# Patient Record
Sex: FEMALE | Race: WHITE | NOT HISPANIC OR LATINO
[De-identification: names, ages, dates, MRNs, and addresses within clinical notes are randomized per-mention and may not be internally consistent; named-entity substitution may affect disease eponyms.]

---

## 2017-01-04 PROBLEM — Z00.00 ENCOUNTER FOR PREVENTIVE HEALTH EXAMINATION: Status: ACTIVE | Noted: 2017-01-04

## 2017-02-08 ENCOUNTER — APPOINTMENT (OUTPATIENT)
Dept: SPINE | Facility: CLINIC | Age: 47
End: 2017-02-08

## 2017-02-08 VITALS
SYSTOLIC BLOOD PRESSURE: 113 MMHG | BODY MASS INDEX: 23.3 KG/M2 | DIASTOLIC BLOOD PRESSURE: 74 MMHG | WEIGHT: 145 LBS | HEIGHT: 66 IN | HEART RATE: 92 BPM

## 2017-02-08 DIAGNOSIS — Z83.49 FAMILY HISTORY OF OTHER ENDOCRINE, NUTRITIONAL AND METABOLIC DISEASES: ICD-10-CM

## 2017-02-08 DIAGNOSIS — Z78.9 OTHER SPECIFIED HEALTH STATUS: ICD-10-CM

## 2017-02-08 DIAGNOSIS — Z87.42 PERSONAL HISTORY OF OTHER DISEASES OF THE FEMALE GENITAL TRACT: ICD-10-CM

## 2017-02-08 DIAGNOSIS — Z82.0 FAMILY HISTORY OF EPILEPSY AND OTHER DISEASES OF THE NERVOUS SYSTEM: ICD-10-CM

## 2017-02-08 DIAGNOSIS — Z86.69 PERSONAL HISTORY OF OTHER DISEASES OF THE NERVOUS SYSTEM AND SENSE ORGANS: ICD-10-CM

## 2017-02-08 DIAGNOSIS — M51.26 OTHER INTERVERTEBRAL DISC DISPLACEMENT, LUMBAR REGION: ICD-10-CM

## 2017-02-08 DIAGNOSIS — Z82.3 FAMILY HISTORY OF STROKE: ICD-10-CM

## 2017-02-08 DIAGNOSIS — Z82.49 FAMILY HISTORY OF ISCHEMIC HEART DISEASE AND OTHER DISEASES OF THE CIRCULATORY SYSTEM: ICD-10-CM

## 2017-02-08 DIAGNOSIS — Z83.3 FAMILY HISTORY OF DIABETES MELLITUS: ICD-10-CM

## 2017-02-08 DIAGNOSIS — Z87.39 PERSONAL HISTORY OF OTHER DISEASES OF THE MUSCULOSKELETAL SYSTEM AND CONNECTIVE TISSUE: ICD-10-CM

## 2017-02-08 DIAGNOSIS — Z87.891 PERSONAL HISTORY OF NICOTINE DEPENDENCE: ICD-10-CM

## 2017-02-08 DIAGNOSIS — M70.71 OTHER BURSITIS OF HIP, RIGHT HIP: ICD-10-CM

## 2017-02-08 DIAGNOSIS — Z86.39 PERSONAL HISTORY OF OTHER ENDOCRINE, NUTRITIONAL AND METABOLIC DISEASE: ICD-10-CM

## 2017-02-08 DIAGNOSIS — Z80.9 FAMILY HISTORY OF MALIGNANT NEOPLASM, UNSPECIFIED: ICD-10-CM

## 2017-02-08 DIAGNOSIS — R00.0 TACHYCARDIA, UNSPECIFIED: ICD-10-CM

## 2017-02-08 RX ORDER — LACTOBACILLUS ACIDOPHILUS/PECT 30 MG-20MG
TABLET ORAL
Refills: 0 | Status: ACTIVE | COMMUNITY

## 2017-02-08 RX ORDER — HYDROXYCHLOROQUINE SULFATE 200 MG/1
200 TABLET, FILM COATED ORAL
Qty: 60 | Refills: 0 | Status: ACTIVE | COMMUNITY
Start: 2016-09-03

## 2017-02-08 RX ORDER — ASPIRIN 81 MG
81 TABLET, DELAYED RELEASE (ENTERIC COATED) ORAL
Refills: 0 | Status: ACTIVE | COMMUNITY

## 2017-02-08 RX ORDER — LEVOTHYROXINE SODIUM 0.1 MG/1
100 TABLET ORAL
Qty: 30 | Refills: 0 | Status: ACTIVE | COMMUNITY
Start: 2016-08-11

## 2017-02-08 RX ORDER — DILTIAZEM HYDROCHLORIDE 180 MG/1
180 CAPSULE, EXTENDED RELEASE ORAL
Qty: 30 | Refills: 0 | Status: ACTIVE | COMMUNITY
Start: 2016-08-11

## 2018-05-17 ENCOUNTER — OUTPATIENT (OUTPATIENT)
Dept: OUTPATIENT SERVICES | Facility: HOSPITAL | Age: 48
LOS: 1 days | Discharge: HOME | End: 2018-05-17

## 2018-05-21 ENCOUNTER — APPOINTMENT (OUTPATIENT)
Dept: HEMATOLOGY ONCOLOGY | Facility: CLINIC | Age: 48
End: 2018-05-21
Payer: COMMERCIAL

## 2018-05-21 VITALS
TEMPERATURE: 98.3 F | WEIGHT: 163.47 LBS | SYSTOLIC BLOOD PRESSURE: 124 MMHG | BODY MASS INDEX: 26.27 KG/M2 | OXYGEN SATURATION: 94 % | HEIGHT: 66 IN | DIASTOLIC BLOOD PRESSURE: 76 MMHG | HEART RATE: 80 BPM

## 2018-05-21 PROCEDURE — 99243 OFF/OP CNSLTJ NEW/EST LOW 30: CPT

## 2019-04-08 ENCOUNTER — INPATIENT (INPATIENT)
Facility: HOSPITAL | Age: 49
LOS: 0 days | Discharge: HOME | DRG: 103 | End: 2019-04-09
Attending: INTERNAL MEDICINE | Admitting: INTERNAL MEDICINE
Payer: COMMERCIAL

## 2019-04-08 VITALS
HEART RATE: 84 BPM | OXYGEN SATURATION: 97 % | HEIGHT: 65 IN | TEMPERATURE: 98 F | WEIGHT: 164.91 LBS | SYSTOLIC BLOOD PRESSURE: 121 MMHG | DIASTOLIC BLOOD PRESSURE: 83 MMHG | RESPIRATION RATE: 16 BRPM

## 2019-04-08 DIAGNOSIS — E03.9 HYPOTHYROIDISM, UNSPECIFIED: ICD-10-CM

## 2019-04-08 DIAGNOSIS — R51 HEADACHE: ICD-10-CM

## 2019-04-08 DIAGNOSIS — R00.0 TACHYCARDIA, UNSPECIFIED: ICD-10-CM

## 2019-04-08 DIAGNOSIS — M06.9 RHEUMATOID ARTHRITIS, UNSPECIFIED: ICD-10-CM

## 2019-04-08 DIAGNOSIS — Z29.9 ENCOUNTER FOR PROPHYLACTIC MEASURES, UNSPECIFIED: ICD-10-CM

## 2019-04-08 DIAGNOSIS — G62.9 POLYNEUROPATHY, UNSPECIFIED: ICD-10-CM

## 2019-04-08 LAB
ALBUMIN SERPL ELPH-MCNC: 4.4 G/DL — SIGNIFICANT CHANGE UP (ref 3.3–5)
ALP SERPL-CCNC: 55 U/L — SIGNIFICANT CHANGE UP (ref 30–120)
ALT FLD-CCNC: 35 U/L DA — SIGNIFICANT CHANGE UP (ref 10–60)
ANION GAP SERPL CALC-SCNC: 10 MMOL/L — SIGNIFICANT CHANGE UP (ref 5–17)
APTT BLD: 30 SEC — SIGNIFICANT CHANGE UP (ref 28.5–37)
AST SERPL-CCNC: 30 U/L — SIGNIFICANT CHANGE UP (ref 10–40)
BASOPHILS # BLD AUTO: 0.05 K/UL — SIGNIFICANT CHANGE UP (ref 0–0.2)
BASOPHILS NFR BLD AUTO: 1 % — SIGNIFICANT CHANGE UP (ref 0–2)
BILIRUB SERPL-MCNC: 0.3 MG/DL — SIGNIFICANT CHANGE UP (ref 0.2–1.2)
BUN SERPL-MCNC: 8 MG/DL — SIGNIFICANT CHANGE UP (ref 7–23)
CALCIUM SERPL-MCNC: 9.1 MG/DL — SIGNIFICANT CHANGE UP (ref 8.4–10.5)
CHLORIDE SERPL-SCNC: 106 MMOL/L — SIGNIFICANT CHANGE UP (ref 96–108)
CO2 SERPL-SCNC: 28 MMOL/L — SIGNIFICANT CHANGE UP (ref 22–31)
CREAT SERPL-MCNC: 0.74 MG/DL — SIGNIFICANT CHANGE UP (ref 0.5–1.3)
EOSINOPHIL # BLD AUTO: 0.04 K/UL — SIGNIFICANT CHANGE UP (ref 0–0.5)
EOSINOPHIL NFR BLD AUTO: 0.8 % — SIGNIFICANT CHANGE UP (ref 0–6)
GLUCOSE SERPL-MCNC: 101 MG/DL — HIGH (ref 70–99)
HCG SERPL-ACNC: 6 MIU/ML — SIGNIFICANT CHANGE UP
HCT VFR BLD CALC: 43 % — SIGNIFICANT CHANGE UP (ref 34.5–45)
HGB BLD-MCNC: 14.6 G/DL — SIGNIFICANT CHANGE UP (ref 11.5–15.5)
IMM GRANULOCYTES NFR BLD AUTO: 0.2 % — SIGNIFICANT CHANGE UP (ref 0–1.5)
INR BLD: 0.97 RATIO — SIGNIFICANT CHANGE UP (ref 0.88–1.16)
LYMPHOCYTES # BLD AUTO: 1.29 K/UL — SIGNIFICANT CHANGE UP (ref 1–3.3)
LYMPHOCYTES # BLD AUTO: 25.4 % — SIGNIFICANT CHANGE UP (ref 13–44)
MCHC RBC-ENTMCNC: 33.9 PG — SIGNIFICANT CHANGE UP (ref 27–34)
MCHC RBC-ENTMCNC: 34 GM/DL — SIGNIFICANT CHANGE UP (ref 32–36)
MCV RBC AUTO: 99.8 FL — SIGNIFICANT CHANGE UP (ref 80–100)
MONOCYTES # BLD AUTO: 0.71 K/UL — SIGNIFICANT CHANGE UP (ref 0–0.9)
MONOCYTES NFR BLD AUTO: 14 % — SIGNIFICANT CHANGE UP (ref 2–14)
NEUTROPHILS # BLD AUTO: 2.98 K/UL — SIGNIFICANT CHANGE UP (ref 1.8–7.4)
NEUTROPHILS NFR BLD AUTO: 58.6 % — SIGNIFICANT CHANGE UP (ref 43–77)
NRBC # BLD: 0 /100 WBCS — SIGNIFICANT CHANGE UP (ref 0–0)
PLATELET # BLD AUTO: 286 K/UL — SIGNIFICANT CHANGE UP (ref 150–400)
POTASSIUM SERPL-MCNC: 4 MMOL/L — SIGNIFICANT CHANGE UP (ref 3.5–5.3)
POTASSIUM SERPL-SCNC: 4 MMOL/L — SIGNIFICANT CHANGE UP (ref 3.5–5.3)
PROT SERPL-MCNC: 7.8 G/DL — SIGNIFICANT CHANGE UP (ref 6–8.3)
PROTHROM AB SERPL-ACNC: 10.6 SEC — SIGNIFICANT CHANGE UP (ref 10–12.9)
RBC # BLD: 4.31 M/UL — SIGNIFICANT CHANGE UP (ref 3.8–5.2)
RBC # FLD: 12.6 % — SIGNIFICANT CHANGE UP (ref 10.3–14.5)
SODIUM SERPL-SCNC: 144 MMOL/L — SIGNIFICANT CHANGE UP (ref 135–145)
WBC # BLD: 5.08 K/UL — SIGNIFICANT CHANGE UP (ref 3.8–10.5)
WBC # FLD AUTO: 5.08 K/UL — SIGNIFICANT CHANGE UP (ref 3.8–10.5)

## 2019-04-08 PROCEDURE — 99285 EMERGENCY DEPT VISIT HI MDM: CPT

## 2019-04-08 PROCEDURE — 93306 TTE W/DOPPLER COMPLETE: CPT | Mod: 26

## 2019-04-08 PROCEDURE — 70450 CT HEAD/BRAIN W/O DYE: CPT | Mod: 26

## 2019-04-08 PROCEDURE — 70496 CT ANGIOGRAPHY HEAD: CPT | Mod: 26

## 2019-04-08 RX ORDER — LEVOTHYROXINE SODIUM 125 MCG
1 TABLET ORAL
Qty: 0 | Refills: 0 | COMMUNITY

## 2019-04-08 RX ORDER — HYDROXYCHLOROQUINE SULFATE 200 MG
0 TABLET ORAL
Qty: 0 | Refills: 0 | COMMUNITY

## 2019-04-08 RX ORDER — ASPIRIN/CALCIUM CARB/MAGNESIUM 324 MG
81 TABLET ORAL DAILY
Qty: 0 | Refills: 0 | Status: DISCONTINUED | OUTPATIENT
Start: 2019-04-08 | End: 2019-04-09

## 2019-04-08 RX ORDER — LEVOTHYROXINE SODIUM 125 MCG
100 TABLET ORAL DAILY
Qty: 0 | Refills: 0 | Status: DISCONTINUED | OUTPATIENT
Start: 2019-04-08 | End: 2019-04-09

## 2019-04-08 RX ORDER — TOFACITINIB 11 MG/1
1 TABLET, FILM COATED, EXTENDED RELEASE ORAL
Qty: 0 | Refills: 0 | COMMUNITY

## 2019-04-08 RX ORDER — DILTIAZEM HCL 120 MG
180 CAPSULE, EXT RELEASE 24 HR ORAL DAILY
Qty: 0 | Refills: 0 | Status: DISCONTINUED | OUTPATIENT
Start: 2019-04-08 | End: 2019-04-09

## 2019-04-08 RX ORDER — ASPIRIN/CALCIUM CARB/MAGNESIUM 324 MG
1 TABLET ORAL
Qty: 0 | Refills: 0 | COMMUNITY

## 2019-04-08 RX ORDER — HYDROXYCHLOROQUINE SULFATE 200 MG
200 TABLET ORAL
Qty: 0 | Refills: 0 | Status: DISCONTINUED | OUTPATIENT
Start: 2019-04-08 | End: 2019-04-09

## 2019-04-08 RX ORDER — ACETAMINOPHEN 500 MG
650 TABLET ORAL EVERY 6 HOURS
Qty: 0 | Refills: 0 | Status: DISCONTINUED | OUTPATIENT
Start: 2019-04-08 | End: 2019-04-09

## 2019-04-08 RX ORDER — ATORVASTATIN CALCIUM 80 MG/1
20 TABLET, FILM COATED ORAL AT BEDTIME
Qty: 0 | Refills: 0 | Status: DISCONTINUED | OUTPATIENT
Start: 2019-04-08 | End: 2019-04-09

## 2019-04-08 RX ORDER — IBUPROFEN 200 MG
400 TABLET ORAL ONCE
Qty: 0 | Refills: 0 | Status: COMPLETED | OUTPATIENT
Start: 2019-04-08 | End: 2019-04-08

## 2019-04-08 RX ADMIN — Medication 81 MILLIGRAM(S): at 18:33

## 2019-04-08 RX ADMIN — Medication 200 MILLIGRAM(S): at 18:31

## 2019-04-08 RX ADMIN — ATORVASTATIN CALCIUM 20 MILLIGRAM(S): 80 TABLET, FILM COATED ORAL at 21:59

## 2019-04-08 RX ADMIN — Medication 650 MILLIGRAM(S): at 17:45

## 2019-04-08 RX ADMIN — Medication 400 MILLIGRAM(S): at 21:00

## 2019-04-08 RX ADMIN — Medication 650 MILLIGRAM(S): at 15:55

## 2019-04-08 RX ADMIN — Medication 50 MILLIGRAM(S): at 18:31

## 2019-04-08 RX ADMIN — Medication 400 MILLIGRAM(S): at 20:20

## 2019-04-08 NOTE — CONSULT NOTE ADULT - ASSESSMENT
Send by her Neurologist for Headache and feeling 3 days ago that some thing popped in her head.  Non focal exam.  CT Head/CTA head done on my recommendation.  CT Head no acute pathology.  CT Head  - Normal study.  R/o CVA, Demyelinating disease.   Admission.  Tylenol for HA.  Ecotrin 81 mg  Carotid doppler  Lipid panel/HbA1c.  MRI Brain pre/post with gado in am.  D/w pt. Questions answered.  D/w Dr De Leon/CEE Wallis  Would follow.

## 2019-04-08 NOTE — H&P ADULT - PROBLEM SELECTOR PLAN 1
Admit  Neurology Eval  Will need MRI  Further work-up/management pending clinical course. Admit  ASA 81mg qd  Start lipitor 20mg qhs  Neuro Check q4h  Neurology Eval  Will need MRI  Further work-up/management pending clinical course. Admit  ASA 81mg qd  Start lipitor 20mg qhs  Neuro Check q4h  Neurology Eval  Will need MRI  ECHO  Carotid u/s to r/o CONOR  Further work-up/management pending clinical course.

## 2019-04-08 NOTE — ED ADULT NURSE NOTE - OBJECTIVE STATEMENT
49 yr. old female c/o sharp head pain with nausea/dizziness/visual disturbances x 20 minutes on Friday.  Pt. c/o pressure on top of head yesterday.  Currently, pain 5/10.

## 2019-04-08 NOTE — ED PROVIDER NOTE - OBJECTIVE STATEMENT
48 y/o female with a PMHx of RA presents to the ED c/o headache. 4 days ago pt was at work felt a painful snap in the back her head, immediately felt nausea, blurry vision, and about to pass out. Sx lasted about 20 minutes then went away. Pt saw her PMD that day for a regular checkup and was told if it happens again to go to a neurologist. 3 days ago pt felt another snap on the side of her head with similar sx. Saw Dr. Peña and was referred to the ED for CT head to r/o possible bleed. Denies fever, chills, nausea at this time. Allergy to morphine, urticaria. Former smoker (quit 20 years ago)  Neuro: Dr. Peña. PCP: Dr. Sanz

## 2019-04-08 NOTE — CONSULT NOTE ADULT - SUBJECTIVE AND OBJECTIVE BOX
Patient is a 49y old  Female who presents with a chief complaint of Headache.    HPI: 48 y/o female with a PMHx of RA presents to the ED c/o headache. 4 days ago pt was at work felt a painful snap in the back her head, immediately felt nausea, blurry vision, and about to pass out. Sx lasted about 20 minutes then went away. Pt saw her PMD  that day for a regular checkup and was told if it happens again to go to a neurologist. 3 days ago pt felt another snap on the side of her head with similar sx. Saw Dr. Peña and was referred to the ED for CT head to r/o possible bleed.   Pt also has family history of Brain aneurysm.   Denies fever, chills, nausea at this time.   No fever. No neck pain or stiffness.  Does have h/o ocular migraine.  C/o numbness on right ulnar border for couple of moths.  Has allergy to morphine - urticaria.   Former smoker (quit 20 years ago)  Neuro: Dr. Peña. PCP: Dr. Sanz.    PAST MEDICAL & SURGICAL HISTORY:    RA (rheumatoid arthritis)  Ocular Migraine  Hypothyroidism.  Palpitations.    No significant past surgical history    Home Medications:     * Patient Currently Takes Medications as of 08-Apr-2019 11:58 documented in Structured Notes  · 	Synthroid 100 mcg (0.1 mg) oral tablet: Last Dose Taken:  , 1 tab(s) orally once a day  · 	Plaquenil 200 mg oral tablet: Last Dose Taken:  , orally 2 times a day  · 	Xeljanz XR 11 mg oral tablet, extended release: Last Dose Taken:  , 1 tab(s) orally once a day  · 	Lyrica 50 mg oral capsule: Last Dose Taken:  , 1 cap(s) orally 2 times a day  · 	Cardizem: Last Dose Taken:  , 180  orally once a day  · 	aspirin 81 mg oral tablet: Last Dose Taken:  , 1 tab(s) orally once a day    Allergies    morphine (Rash)    SOCIAL HISTORY:    No h/o alcohol use.  Ex Smoker. Quite in past 20 yrs ago.     FAMILY HISTORY: Father's brother has brain aneurysm.     REVIEW OF SYSTEMS:    CONSTITUTIONAL: No fever  EYES: No eye pain,   ENMT:  No sinus or throat pain  NECK: No pain or stiffness  RESPIRATORY: No cough, No hemoptysis; No shortness of breath  CARDIOVASCULAR: No acute chest pain, palpitations,  or leg swelling  GASTROINTESTINAL: No abdominal pain. No nausea, vomiting, or hematemesis;  No melena or hematochezia.  GENITOURINARY: No  hematuria, or incontinence  MUSCULOSKELETAL: No joint swelling; No extremity pain  SKIN: No itching, rashes, or lesions   LYMPH NODES: No enlarged glands  NEUROLOGICAL: No headaches, memory loss,   PSYCHIATRIC: No depression, anxiety, mood swings, or difficulty sleeping  ENDOCRINE: No heat or cold intolerance;   HEME/LYMPH: No easy bruising, or bleeding gums  Allergy/Immunology. No medication allergy. No seasonal allergies.    PHYSICAL EXAM:  Vital Signs Last 24 Hrs  T(F): 98.3 (04-08-19 @ 11:48)  HR: 84 (04-08-19 @ 11:48)  BP: 121/83 (04-08-19 @ 11:48)  RR: 16 (04-08-19 @ 11:48)    GENERAL: NAD, well-groomed, well-developed  HEAD:  Atraumatic, Normocephalic  EYES: EOMI, PERRLA, conjunctiva and sclera clear  NECK: Supple, No JVD, thyroid non-palpable    On Neurological Examination:    Mental Status - Pt is alert, awake, oriented X3. Higher functions are intact. Follows commands well and able to answer questions appropriately.    Speech -  Normal. Pt has no aphasia.    Cranial Nerves - Pupils 3 mm equal and reactive to light, extraocular eye movements intact. Pt has no visual field deficit. Pt has no facial asymmetry. Tongue - is in midline.    Motor Exam - 4 plus/5 all over, No drift. No shaking or tremors. Muscle tone - is normal all over. Moves all extremities equally. No asymmetry is seen.      Sensory Exam - Pin prick, temperature, joint position and vibration are intact on either side. No complaints of tingling, numbness.    Gait - Able to stand and walk unassisted. Not falling to either side.    Deep tendon Reflexes - 2 plus all over.    Coordination - Fine finger movements are normal on both sides. Finger to nose is also normal on both sides.       Romberg - Negative.    Neck Supple -  Yes.    LABS:                        14.6   5.08  )-----------( 286      ( 08 Apr 2019 12:45 )             43.0     04-08    144  |  106  |  8   ----------------------------<  101<H>  4.0   |  28  |  0.74    Ca    9.1      08 Apr 2019 12:45    TPro  7.8  /  Alb  4.4  /  TBili  0.3  /  DBili  x   /  AST  30  /  ALT  35  /  AlkPhos  55  04-08    PT/INR - ( 08 Apr 2019 12:45 )   PT: 10.6 sec;   INR: 0.97 ratio      PTT - ( 08 Apr 2019 12:45 )  PTT:30.0 sec    RADIOLOGY & ADDITIONAL STUDIES:    < from: CT Angio Head w/ IV Cont (04.08.19 @ 14:08) >  IMPRESSION:    Normal study    < end of copied text >      < from: CT Head No Cont (04.08.19 @ 13:39) >    IMPRESSION: Unremarkable noncontrast head CT.      < end of copied text >

## 2019-04-08 NOTE — H&P ADULT - HISTORY OF PRESENT ILLNESS
48 y/o female with a PMHx of RA tachycardia hypothyroid neuropathy presents to the ED c/o headache. 4 days ago pt was at work felt a painful snap in the back her head, immediately felt nausea, blurry vision, and about to pass out. Sx lasted about 20 minutes then went away. Pt saw her PMD that day for a regular checkup and was told if it happens again to go to a neurologist. 3 days ago pt felt another snap on the side of her head with similar sx. Saw Dr. Peña and was referred to the ED for CT head to r/o possible bleed. Denies fever, chills, nausea at this time. Allergy to morphine, urticaria. Former smoker (quit 20 years ago)

## 2019-04-08 NOTE — ED PROVIDER NOTE - PROGRESS NOTE DETAILS
Paresh FREEMAN for ED attending, Dr. De Leon : Dr. Crespo saw and evaluated pt. Will admit ap to Dr. Fan. Paresh FREEMAN for ED attending, Dr. De Leon : Dr. Crespo saw and evaluated pt. Will admit to Dr. Fan.

## 2019-04-09 ENCOUNTER — TRANSCRIPTION ENCOUNTER (OUTPATIENT)
Age: 49
End: 2019-04-09

## 2019-04-09 ENCOUNTER — OUTPATIENT (OUTPATIENT)
Dept: OUTPATIENT SERVICES | Facility: HOSPITAL | Age: 49
LOS: 1 days | End: 2019-04-09
Payer: COMMERCIAL

## 2019-04-09 VITALS
SYSTOLIC BLOOD PRESSURE: 107 MMHG | HEART RATE: 71 BPM | OXYGEN SATURATION: 98 % | DIASTOLIC BLOOD PRESSURE: 68 MMHG | TEMPERATURE: 98 F | RESPIRATION RATE: 17 BRPM

## 2019-04-09 DIAGNOSIS — R51 HEADACHE: ICD-10-CM

## 2019-04-09 LAB
CHOLEST SERPL-MCNC: 200 MG/DL — HIGH (ref 10–199)
HDLC SERPL-MCNC: 71 MG/DL — SIGNIFICANT CHANGE UP
LIPID PNL WITH DIRECT LDL SERPL: 120 MG/DL — HIGH
TOTAL CHOLESTEROL/HDL RATIO MEASUREMENT: 2.8 RATIO — LOW (ref 3.3–7.1)
TRIGL SERPL-MCNC: 43 MG/DL — SIGNIFICANT CHANGE UP (ref 10–149)

## 2019-04-09 PROCEDURE — 80053 COMPREHEN METABOLIC PANEL: CPT

## 2019-04-09 PROCEDURE — 85730 THROMBOPLASTIN TIME PARTIAL: CPT

## 2019-04-09 PROCEDURE — 84702 CHORIONIC GONADOTROPIN TEST: CPT

## 2019-04-09 PROCEDURE — 70553 MRI BRAIN STEM W/O & W/DYE: CPT | Mod: 26

## 2019-04-09 PROCEDURE — 99285 EMERGENCY DEPT VISIT HI MDM: CPT

## 2019-04-09 PROCEDURE — 36415 COLL VENOUS BLD VENIPUNCTURE: CPT

## 2019-04-09 PROCEDURE — 70496 CT ANGIOGRAPHY HEAD: CPT

## 2019-04-09 PROCEDURE — 70450 CT HEAD/BRAIN W/O DYE: CPT

## 2019-04-09 PROCEDURE — 85027 COMPLETE CBC AUTOMATED: CPT

## 2019-04-09 PROCEDURE — 85610 PROTHROMBIN TIME: CPT

## 2019-04-09 PROCEDURE — 80061 LIPID PANEL: CPT

## 2019-04-09 PROCEDURE — A9579: CPT

## 2019-04-09 PROCEDURE — 70553 MRI BRAIN STEM W/O & W/DYE: CPT

## 2019-04-09 PROCEDURE — 93306 TTE W/DOPPLER COMPLETE: CPT

## 2019-04-09 RX ORDER — IBUPROFEN 200 MG
400 TABLET ORAL ONCE
Qty: 0 | Refills: 0 | Status: COMPLETED | OUTPATIENT
Start: 2019-04-09 | End: 2019-04-09

## 2019-04-09 RX ORDER — ATORVASTATIN CALCIUM 80 MG/1
1 TABLET, FILM COATED ORAL
Qty: 0 | Refills: 0 | COMMUNITY
Start: 2019-04-09

## 2019-04-09 RX ADMIN — Medication 650 MILLIGRAM(S): at 11:22

## 2019-04-09 RX ADMIN — Medication 81 MILLIGRAM(S): at 11:42

## 2019-04-09 RX ADMIN — Medication 200 MILLIGRAM(S): at 05:54

## 2019-04-09 RX ADMIN — Medication 1 MILLIGRAM(S): at 14:25

## 2019-04-09 RX ADMIN — Medication 100 MICROGRAM(S): at 05:54

## 2019-04-09 RX ADMIN — Medication 650 MILLIGRAM(S): at 06:39

## 2019-04-09 RX ADMIN — Medication 50 MILLIGRAM(S): at 05:55

## 2019-04-09 RX ADMIN — Medication 650 MILLIGRAM(S): at 05:55

## 2019-04-09 RX ADMIN — Medication 200 MILLIGRAM(S): at 17:10

## 2019-04-09 RX ADMIN — Medication 50 MILLIGRAM(S): at 17:10

## 2019-04-09 RX ADMIN — Medication 400 MILLIGRAM(S): at 02:00

## 2019-04-09 RX ADMIN — Medication 180 MILLIGRAM(S): at 05:54

## 2019-04-09 RX ADMIN — Medication 400 MILLIGRAM(S): at 01:10

## 2019-04-09 NOTE — DISCHARGE NOTE NURSING/CASE MANAGEMENT/SOCIAL WORK - NSDCDPATPORTLINK_GEN_ALL_CORE
You can access the [facility] Portal, offered by [facility], by registering with the following website: [email or web address]

## 2019-04-09 NOTE — DISCHARGE NOTE PROVIDER - CARE PROVIDER_API CALL
Rose Marie Peña)  Neurology  [address], [address]  [place] [address]  Phone: [phone]  Fax: [phone]  Follow Up Time:     Ruthann   Phone: (   )    -  Fax: (   )    -  Follow Up Time:

## 2019-04-09 NOTE — PROGRESS NOTE ADULT - SUBJECTIVE AND OBJECTIVE BOX
Neurology Follow up note    TREVOR LICONA 49y Female with headache    HPI: 48 y/o female with a PMHx of RA tachycardia hypothyroid neuropathy presents to the ED c/o headache. 4 days ago pt was at work felt a painful snap in the back her head, immediately felt nausea, blurry vision, and about to pass out. Sx lasted about 20 minutes then went away. Pt saw her PMD that day for a regular checkup and was told if it happens again to go to a neurologist. 3 days ago pt felt another snap on the side of her head with similar sx. Saw Dr. Peña and was referred to the ED for CT head to r/o possible bleed. Denies fever, chills, nausea at this time. Allergy to morphine, urticaria. Former smoker (quit 20 years ago) (08 Apr 2019 14:35)    Interval History -    Patient is seen, chart was reviewed and case was discussed with the treatment team.  C/o headache on top of her head with photo/phono phobia.  Vital Signs Last 24 Hrs  T(C): 36.7 (09 Apr 2019 08:02), Max: 36.7 (09 Apr 2019 08:02)  T(F): 98.1 (09 Apr 2019 08:02), Max: 98.1 (09 Apr 2019 08:02)  HR: 66 (09 Apr 2019 08:02) (63 - 70)  BP: 99/64 (09 Apr 2019 08:02) (99/64 - 113/73)  BP(mean): --  RR: 17 (09 Apr 2019 08:02) (16 - 17)  SpO2: 95% (09 Apr 2019 08:02) (95% - 98%)     MEDICATIONS    acetaminophen   Tablet .. 650 milliGRAM(s) Oral every 6 hours PRN  aspirin enteric coated 81 milliGRAM(s) Oral daily  atorvastatin 20 milliGRAM(s) Oral at bedtime  diltiazem    milliGRAM(s) Oral daily  hydroxychloroquine 200 milliGRAM(s) Oral two times a day  levothyroxine 100 MICROGram(s) Oral daily  LORazepam   Injectable 1 milliGRAM(s) IV Push once  pregabalin 50 milliGRAM(s) Oral two times a day    Allergies    morphine (Rash)      REVIEW OF SYSTEMS:    CONSTITUTIONAL: No fever  EYES: No eye pain,   ENMT:  No sinus or throat pain. Does have HA.  NECK: No pain or stiffness  RESPIRATORY: No cough, No hemoptysis; No shortness of breath  CARDIOVASCULAR: No acute chest pain, palpitations,  or leg swelling  GASTROINTESTINAL: No abdominal pain. No nausea, vomiting, or hematemesis;  No melena or hematochezia.  GENITOURINARY: No  hematuria, or incontinence  MUSCULOSKELETAL: No joint swelling; No extremity pain  SKIN: No itching, rashes, or lesions   LYMPH NODES: No enlarged glands  NEUROLOGICAL: No headaches, memory loss,   PSYCHIATRIC: No depression, anxiety, mood swings, or difficulty sleeping  ENDOCRINE: No heat or cold intolerance;   HEME/LYMPH: No easy bruising, or bleeding gums  Allergy/Immunology. No medication allergy. No seasonal allergies.    PHYSICAL EXAM:  GENERAL: NAD, well-groomed, well-developed  HEAD:  Atraumatic, Normocephalic  EYES: EOMI, PERRLA, conjunctiva and sclera clear  NECK: Supple, No JVD, thyroid non-palpable    On Neurological Examination:    Mental Status - Pt is alert, awake, oriented X3. Higher functions are intact. Follows commands well and able to answer questions appropriately.    Speech -  Normal. Pt has no aphasia.    Cranial Nerves - Pupils 3 mm equal and reactive to light, extraocular eye movements intact. Pt has no visual field deficit. Pt has no facial asymmetry. Tongue - is in midline.    Motor Exam - 4 plus/5 all over, No drift. No shaking or tremors. Muscle tone - is normal all over. Moves all extremities equally. No asymmetry is seen.      Sensory Exam - Pin prick, temperature, joint position and vibration are intact on either side. No complaints of tingling, numbness.    Gait - Able to stand and walk unassisted. Not falling to either side.    Deep tendon Reflexes - 2 plus all over.    Coordination - Fine finger movements are normal on both sides. Finger to nose is also normal on both sides.       Romberg - Negative.    Neck Supple -  Yes.    LABS:    CBC Full  -  ( 08 Apr 2019 12:45 )  WBC Count : 5.08 K/uL  RBC Count : 4.31 M/uL  Hemoglobin : 14.6 g/dL  Hematocrit : 43.0 %  Platelet Count - Automated : 286 K/uL  Mean Cell Volume : 99.8 fl  Mean Cell Hemoglobin : 33.9 pg  Mean Cell Hemoglobin Concentration : 34.0 gm/dL  Auto Neutrophil # : 2.98 K/uL  Auto Lymphocyte # : 1.29 K/uL  Auto Monocyte # : 0.71 K/uL  Auto Eosinophil # : 0.04 K/uL  Auto Basophil # : 0.05 K/uL  Auto Neutrophil % : 58.6 %  Auto Lymphocyte % : 25.4 %  Auto Monocyte % : 14.0 %  Auto Eosinophil % : 0.8 %  Auto Basophil % : 1.0 %    04-08    144  |  106  |  8   ----------------------------<  101<H>  4.0   |  28  |  0.74    Ca    9.1      08 Apr 2019 12:45    TPro  7.8  /  Alb  4.4  /  TBili  0.3  /  DBili  x   /  AST  30  /  ALT  35  /  AlkPhos  55  04-08    Lipid Panel 04-09 @ 11:11  Total Cholesterol, Serum 200    Triglycerides 43    RADIOLOGY & ADDITIONAL STUDIES:    < from: US Transthoracic Echocardiogram w/Doppler Complete (04.08.19 @ 17:59) >    CONCLUSIONS:  1. Endocardium not well visualized. Grossly, normal left ventricular size   and overall function with estimated ejection fraction 55%.  2. Mildly thickened mitral leaflets with mild prolapse of the anterior   mitral leaflet.  3. Mild tricuspid regurgitation.     < end of copied text >      < from: US Duplex Carotid Arteries Complete, Bilateral (04.08.19 @ 17:55) >    No evidence of hemodynamically significant stenosis by velocity   measurements.    < end of copied text >      < from: CT Angio Head w/ IV Cont (04.08.19 @ 14:08) >  IMPRESSION:    Normal study    < end of copied text >      < from: CT Head No Cont (04.08.19 @ 13:39) >    IMPRESSION: Unremarkable noncontrast head CT.      < end of copied text >

## 2019-04-09 NOTE — PROGRESS NOTE ADULT - ASSESSMENT
Send by her Neurologist for Headache and feeling 3 days ago that some thing popped in her head.  CT Head/CTA head done on my recommendation.  CT Head no acute pathology.  CTA Head  - Normal study.   Pt is allergic to Morphine - no other meds could be used for HA  -Put on Tylenol/Motrin.  No signs of Temporal arteritis.  Carotid doppler  - Normal study.  LDL - 120   Ecotrin 81 mg  HbA1c - Pending.  MRI Brain pre/post with gado today. If Neg DC pt home.  D/w pt/. Questions answered.  D/w covering nurse.  Dx  - Migraine HA.   F/up with her neurologist Dr. Peña.

## 2019-04-09 NOTE — PROGRESS NOTE ADULT - SUBJECTIVE AND OBJECTIVE BOX
Patient is a 49y old  Female who presents with a chief complaint of Headache x 4 day (08 Apr 2019 14:35)      INTERVAL HPI/OVERNIGHT EVENTS: Patient seen and examined. NAD. C/O persistent headache -- worse at times. + photophobia (new); Denies blurry vision.    Vital Signs Last 24 Hrs  T(C): 36.7 (09 Apr 2019 08:02), Max: 36.8 (08 Apr 2019 11:48)  T(F): 98.1 (09 Apr 2019 08:02), Max: 98.3 (08 Apr 2019 11:48)  HR: 66 (09 Apr 2019 08:02) (63 - 84)  BP: 99/64 (09 Apr 2019 08:02) (99/64 - 121/83)  BP(mean): --  RR: 17 (09 Apr 2019 08:02) (16 - 17)  SpO2: 95% (09 Apr 2019 08:02) (95% - 98%)    04-08    144  |  106  |  8   ----------------------------<  101<H>  4.0   |  28  |  0.74    Ca    9.1      08 Apr 2019 12:45    TPro  7.8  /  Alb  4.4  /  TBili  0.3  /  DBili  x   /  AST  30  /  ALT  35  /  AlkPhos  55  04-08                          14.6   5.08  )-----------( 286      ( 08 Apr 2019 12:45 )             43.0     PT/INR - ( 08 Apr 2019 12:45 )   PT: 10.6 sec;   INR: 0.97 ratio         PTT - ( 08 Apr 2019 12:45 )  PTT:30.0 sec  CAPILLARY BLOOD GLUCOSE    < from: US Transthoracic Echocardiogram w/Doppler Complete (04.08.19 @ 17:59) >    EXAM:  US TTE W DOPPLER COMPLETE                                  PROCEDURE DATE:  04/08/2019          INTERPRETATION:  Ordering Physician: TULIO GREGORY [id number]    Indication: CVA    Technician: Magdalena Maguire    Study Quality: Technically Difficult     Height: 5 feet 5 inches  Weight: 165 pounds  Blood Pressure: 121/83    MEASUREMENTS  IVS: 0.7 cm  PWT: 0.9 cm  LA: 3.3 cm  Aortic Root: 2.8 cm  LVIDd: 4.9 cm  LVIDs: 3.5 cm    LVEF: Approximately 55%    FINDINGS  Left Ventricle: Endocardium not well visualized. Grossly, normal left   ventricular size and overall function with estimated ejection fraction   55%.  Right Ventricle: Normal right ventricular size and function.  Left Atrium: Normal left atrium.  Right Atrium: Normal rightatrium.  Mitral Valve: Mildly thickened mitral leaflets with mild prolapse of the   anterior mitral leaflet.  Aortic Valve: Grossly normal aortic valve (leaflet morphology not well   seen).  Tricuspid Valve: Normal tricuspid valve. Mild tricuspid regurgitation.   Pulmonary artery systolic pressure estimated at 22 mmHg, assuming a right   atrial pressure of 3 mmHg, which is normal.  Pulmonic Valve: Pulmonic valve not well visualized.  Pericardium/Pleura: No pericardial effusion.      CONCLUSIONS:  1. Endocardium not well visualized. Grossly, normal left ventricular size   and overall function with estimated ejection fraction 55%.  2. Mildly thickened mitral leaflets with mild prolapse of the anterior   mitral leaflet.  3. Mild tricuspid regurgitation.                     ELSA GRAHAM M.D., ATTENDING CARDIOLOGIST  This document has been electronically signed. Apr 9 2019  7:17AM                < end of copied text >      < from: US Duplex Carotid Arteries Complete, Bilateral (04.08.19 @ 17:55) >    EXAM:  US DPLX CAROTIDS COMPL BI                                  PROCEDURE DATE:  04/08/2019          INTERPRETATION:  CLINICAL STATEMENT: Evaluate for carotid artery   stenosis.  Headache and visual disturbance.    TECHNIQUE: Carotid artery ultrasound was performed.    COMPARISON: None.    FINDINGS: Mild atherosclerotic plaque is seen in the right bulb.    There is no significant narrowing in the visualized common carotid and   internal carotid arteries.    Peak systolic velocity measurementsin centimeters per second as   described below.    RIGHT:    CCA: 108  ICA: 108  ICA/CCA ratio: 1.0  Incidental note is made of mildly elevated peak diastolic velocities in   the mid and distal ICA.  There is antegrade flow in the right vertebral artery.    LEFT:    CCA: 127  ICA: 118  ICA/CCA ratio: 0.9  There is antegrade flow in the left vertebral artery.    IMPRESSION:    No evidence of hemodynamically significant stenosis by velocity   measurements.    Measurement of carotid stenosis is based on velocity parameters that   correlate the residual internal carotid diameter with that of the more   distal vessel in accordance with a method such as the [place] American   Symptomatic Carotid Endarterectomy Trial (NASCET).                   DERRELL WU M.D.,ATTENDING RADIOLOGIST  This document has been electronically signed. Apr 8 2019  6:28PM                < end of copied text >                acetaminophen   Tablet .. 650 milliGRAM(s) Oral every 6 hours PRN  aspirin enteric coated 81 milliGRAM(s) Oral daily  atorvastatin 20 milliGRAM(s) Oral at bedtime  diltiazem    milliGRAM(s) Oral daily  hydroxychloroquine 200 milliGRAM(s) Oral two times a day  levothyroxine 100 MICROGram(s) Oral daily  LORazepam   Injectable 1 milliGRAM(s) IV Push once  pregabalin 50 milliGRAM(s) Oral two times a day              REVIEW OF SYSTEMS:  CONSTITUTIONAL: No fever, no weight loss, or no fatigue  NECK: No pain, no stiffness  RESPIRATORY: No cough, no wheezing, no chills, no hemoptysis, No shortness of breath  CARDIOVASCULAR: No chest pain, no palpitations, no dizziness, no leg swelling  GASTROINTESTINAL: No abdominal pain. No nausea, no vomiting, no hematemesis; No diarrhea, no constipation. No melena, no hematochezia.  GENITOURINARY: No dysuria, no frequency, no hematuria, no incontinence  NEUROLOGICAL: + headaches, no loss of strength, no numbness, no tremors; +photophobia  SKIN: No itching, no burning  MUSCULOSKELETAL: No joint pain, no swelling; No muscle, no back, no extremity pain  PSYCHIATRIC: No depression, no mood swings,   HEME/LYMPH: No easy bruising, no bleeding gums  ALLERY AND IMMUNOLOGIC: No hives       Consultant(s) Notes Reviewed:  [X] YES  [ ] NO    PHYSICAL EXAM:  GENERAL: NAD  HEAD:  Atraumatic, Normocephalic  EYES: EOMI, PERRLA, conjunctiva and sclera clear  ENMT: No tonsillar erythema, exudates, or enlargement; Moist mucous membranes  NECK: Supple, No JVD  NERVOUS SYSTEM:  Awake & alert  CHEST/LUNG: Clear to auscultation bilaterally; No rales, rhonchi, wheezing,  HEART: Regular rate and rhythm  ABDOMEN: Soft, Nontender, Nondistended; Bowel sounds present  EXTREMITIES:  No clubbing, cyanosis, or edema  LYMPH: No lymphadenopathy noted  SKIN: No rashes      Advanced care planning discussed with patient/family [X] YES   [ ] NO    Advanced care planning discussed with patient/family. Advanced care planning forms reviewed/discussed/completed. 20 minutes spent.

## 2019-04-09 NOTE — DISCHARGE NOTE PROVIDER - HOSPITAL COURSE
50 y/o female with a PMHx of RA tachycardia hypothyroid neuropathy presents to the ED c/o headache. 4 days ago pt was at work felt a painful snap in the back her head, immediately felt nausea, blurry vision, and about to pass out. Sx lasted about 20 minutes then went away. Pt saw her PMD that day for a regular checkup and was told if it happens again to go to a neurologist. 3 days ago pt felt another snap on the side of her head with similar sx. Saw Dr. Peña and was referred to the ED for CT head to r/o possible bleed. Denies fever, chills, nausea at this time. Allergy to morphine, urticaria. Former smoker (quit 20 years ago)        Work-up negative

## 2019-04-09 NOTE — PROGRESS NOTE ADULT - PROBLEM SELECTOR PLAN 1
Continue ASA 81mg qd  Continue lipitor 20mg qhs  Neuro Check q4h  Neurology f/u  For MRI today  ECHO noted  Carotid u/s no CONOR  Further work-up/management pending clinical course.

## 2019-06-03 ENCOUNTER — OUTPATIENT (OUTPATIENT)
Dept: OUTPATIENT SERVICES | Facility: HOSPITAL | Age: 49
LOS: 1 days | Discharge: HOME | End: 2019-06-03

## 2019-06-03 DIAGNOSIS — D47.2 MONOCLONAL GAMMOPATHY: ICD-10-CM

## 2019-06-04 PROBLEM — G62.9 POLYNEUROPATHY, UNSPECIFIED: Chronic | Status: ACTIVE | Noted: 2019-04-08

## 2019-06-04 PROBLEM — E03.9 HYPOTHYROIDISM, UNSPECIFIED: Chronic | Status: ACTIVE | Noted: 2019-04-08

## 2019-06-04 PROBLEM — M06.9 RHEUMATOID ARTHRITIS, UNSPECIFIED: Chronic | Status: ACTIVE | Noted: 2019-04-08

## 2019-06-05 ENCOUNTER — RESULT REVIEW (OUTPATIENT)
Age: 49
End: 2019-06-05

## 2019-06-05 ENCOUNTER — APPOINTMENT (OUTPATIENT)
Dept: HEMATOLOGY ONCOLOGY | Facility: CLINIC | Age: 49
End: 2019-06-05
Payer: COMMERCIAL

## 2019-06-05 VITALS
HEART RATE: 68 BPM | TEMPERATURE: 98.2 F | HEIGHT: 66 IN | WEIGHT: 168.44 LBS | OXYGEN SATURATION: 99 % | DIASTOLIC BLOOD PRESSURE: 67 MMHG | BODY MASS INDEX: 27.07 KG/M2 | SYSTOLIC BLOOD PRESSURE: 94 MMHG

## 2019-06-05 LAB
ALBUMIN SERPL ELPH-MCNC: 4.5 G/DL
ALP BLD-CCNC: 66 U/L
ALT SERPL-CCNC: 23 U/L
ANION GAP SERPL CALC-SCNC: 13 MMOL/L
AST SERPL-CCNC: 27 U/L
BASOPHILS # BLD AUTO: 0.1 K/UL — SIGNIFICANT CHANGE UP (ref 0–0.2)
BASOPHILS NFR BLD AUTO: 1.1 % — SIGNIFICANT CHANGE UP (ref 0–2)
BILIRUB SERPL-MCNC: 0.2 MG/DL
BUN SERPL-MCNC: 7 MG/DL
CALCIUM SERPL-MCNC: 9.5 MG/DL
CHLORIDE SERPL-SCNC: 102 MMOL/L
CO2 SERPL-SCNC: 26 MMOL/L
CREAT SERPL-MCNC: 0.81 MG/DL
DEPRECATED KAPPA LC FREE/LAMBDA SER: 1.42 RATIO
EOSINOPHIL # BLD AUTO: 0.1 K/UL — SIGNIFICANT CHANGE UP (ref 0–0.5)
EOSINOPHIL NFR BLD AUTO: 1.4 % — SIGNIFICANT CHANGE UP (ref 0–6)
GLUCOSE SERPL-MCNC: 101 MG/DL
HCT VFR BLD CALC: 43.3 % — SIGNIFICANT CHANGE UP (ref 34.5–45)
HGB BLD-MCNC: 14.4 G/DL — SIGNIFICANT CHANGE UP (ref 11.5–15.5)
IGA SER QL IEP: 228 MG/DL
IGG SER QL IEP: 827 MG/DL
IGM SER QL IEP: 314 MG/DL
KAPPA LC CSF-MCNC: 1.2 MG/DL
KAPPA LC SERPL-MCNC: 1.7 MG/DL
LYMPHOCYTES # BLD AUTO: 1 K/UL — SIGNIFICANT CHANGE UP (ref 1–3.3)
LYMPHOCYTES # BLD AUTO: 14.7 % — SIGNIFICANT CHANGE UP (ref 13–44)
MCHC RBC-ENTMCNC: 33.3 G/DL — SIGNIFICANT CHANGE UP (ref 32–36)
MCHC RBC-ENTMCNC: 33.7 PG — SIGNIFICANT CHANGE UP (ref 27–34)
MCV RBC AUTO: 101.2 FL — HIGH (ref 80–100)
MONOCYTES # BLD AUTO: 0.8 K/UL — SIGNIFICANT CHANGE UP (ref 0–0.9)
MONOCYTES NFR BLD AUTO: 12.2 % — SIGNIFICANT CHANGE UP (ref 2–14)
NEUTROPHILS # BLD AUTO: 4.9 K/UL — SIGNIFICANT CHANGE UP (ref 1.8–7.4)
NEUTROPHILS NFR BLD AUTO: 70.6 % — SIGNIFICANT CHANGE UP (ref 43–77)
PLATELET # BLD AUTO: 404 K/UL — HIGH (ref 150–400)
POTASSIUM SERPL-SCNC: 5 MMOL/L
PROT SERPL-MCNC: 7.3 G/DL
RBC # BLD: 4.28 M/UL — SIGNIFICANT CHANGE UP (ref 3.8–5.2)
RBC # FLD: 11.4 % — SIGNIFICANT CHANGE UP (ref 10.3–14.5)
SODIUM SERPL-SCNC: 141 MMOL/L
WBC # BLD: 6.9 K/UL — SIGNIFICANT CHANGE UP (ref 3.8–10.5)
WBC # FLD AUTO: 6.9 K/UL — SIGNIFICANT CHANGE UP (ref 3.8–10.5)

## 2019-06-05 PROCEDURE — 99214 OFFICE O/P EST MOD 30 MIN: CPT

## 2019-06-05 NOTE — HISTORY OF PRESENT ILLNESS
[de-identified] : Ms. Ervin is a 49 year old female with a history of RA (treated on Xeljanz and Plaquenil), following up for MGUS. She was referred with IgM 362, [place] LC 1.49, ratio 24.83. She continues to follow up under annual surveillance. [de-identified] : Hospitalized over night in early April due to vision disturbance and dizziness. Neurology workup led to admittance to r/o TIA (due to FHx). Attributed to migraine. Still has headaches.\par Joints have been stiff, but have not been flaring.\par Taking baby Aspirin.\par

## 2019-06-05 NOTE — ASSESSMENT
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 No evidence of Waldenstrom's or myeloma.\par Today's IgM 314 - stable\par \par Plan:\par - Monitor this case of IgM MGUS yearly, no therapy.

## 2019-06-05 NOTE — ADDENDUM
[FreeTextEntry1] : All medical record entries made by farida Arias were at Dr. Denver Rico's direction, dictated on (6/5/2019).

## 2019-06-10 LAB — M PROTEIN SPEC IFE-MCNC: NORMAL

## 2020-05-31 ENCOUNTER — OUTPATIENT (OUTPATIENT)
Dept: OUTPATIENT SERVICES | Facility: HOSPITAL | Age: 50
LOS: 1 days | Discharge: HOME | End: 2020-05-31

## 2020-05-31 DIAGNOSIS — D47.2 MONOCLONAL GAMMOPATHY: ICD-10-CM

## 2020-06-03 ENCOUNTER — APPOINTMENT (OUTPATIENT)
Dept: HEMATOLOGY ONCOLOGY | Facility: CLINIC | Age: 50
End: 2020-06-03

## 2020-06-30 ENCOUNTER — OUTPATIENT (OUTPATIENT)
Dept: OUTPATIENT SERVICES | Facility: HOSPITAL | Age: 50
LOS: 1 days | Discharge: HOME | End: 2020-06-30

## 2020-06-30 DIAGNOSIS — D47.2 MONOCLONAL GAMMOPATHY: ICD-10-CM

## 2020-07-06 ENCOUNTER — APPOINTMENT (OUTPATIENT)
Dept: HEMATOLOGY ONCOLOGY | Facility: CLINIC | Age: 50
End: 2020-07-06
Payer: COMMERCIAL

## 2020-07-06 PROCEDURE — 99441: CPT

## 2020-07-06 RX ORDER — ABATACEPT 125 MG/ML
INJECTION, SOLUTION SUBCUTANEOUS
Refills: 0 | Status: DISCONTINUED | COMMUNITY
End: 2020-07-06

## 2020-07-06 RX ORDER — OMEPRAZOLE 40 MG/1
40 CAPSULE, DELAYED RELEASE ORAL
Qty: 30 | Refills: 0 | Status: DISCONTINUED | COMMUNITY
Start: 2016-08-11 | End: 2020-07-06

## 2020-07-06 RX ORDER — TOFACITINIB 10 MG/1
TABLET, FILM COATED ORAL
Refills: 0 | Status: ACTIVE | COMMUNITY

## 2020-07-06 RX ORDER — PREGABALIN 50 MG/1
50 CAPSULE ORAL
Qty: 90 | Refills: 0 | Status: DISCONTINUED | COMMUNITY
Start: 2016-12-21 | End: 2020-07-06

## 2021-03-01 NOTE — DISCHARGE NOTE PROVIDER - NSDCCPCAREPLAN_GEN_ALL_CORE_FT
PRINCIPAL DISCHARGE DIAGNOSIS  Diagnosis: Headache  Assessment and Plan of Treatment: Continue current medications  Follow-up with your primary care doctor within 1 week.
related to n/v

## 2021-06-03 NOTE — REASON FOR VISIT
----- Message from Eliana Romero DO sent at 6/1/2021  4:32 PM CDT -----  STD testing was all negative for any infection.   [Follow-Up Visit] : a follow-up visit for [Monoclonal Gammopathy] : monoclonal gammopathy

## 2021-07-02 ENCOUNTER — OUTPATIENT (OUTPATIENT)
Dept: OUTPATIENT SERVICES | Facility: HOSPITAL | Age: 51
LOS: 1 days | Discharge: HOME | End: 2021-07-02

## 2021-07-02 DIAGNOSIS — D47.2 MONOCLONAL GAMMOPATHY: ICD-10-CM

## 2021-07-07 ENCOUNTER — TRANSCRIPTION ENCOUNTER (OUTPATIENT)
Age: 51
End: 2021-07-07

## 2021-07-07 ENCOUNTER — RESULT REVIEW (OUTPATIENT)
Age: 51
End: 2021-07-07

## 2021-07-07 ENCOUNTER — APPOINTMENT (OUTPATIENT)
Dept: HEMATOLOGY ONCOLOGY | Facility: CLINIC | Age: 51
End: 2021-07-07
Payer: COMMERCIAL

## 2021-07-07 VITALS
HEART RATE: 71 BPM | BODY MASS INDEX: 27.97 KG/M2 | HEIGHT: 66 IN | SYSTOLIC BLOOD PRESSURE: 101 MMHG | DIASTOLIC BLOOD PRESSURE: 68 MMHG | WEIGHT: 174 LBS | OXYGEN SATURATION: 97 %

## 2021-07-07 DIAGNOSIS — D47.2 MONOCLONAL GAMMOPATHY: ICD-10-CM

## 2021-07-07 LAB
ALBUMIN SERPL ELPH-MCNC: 5 G/DL
ALP BLD-CCNC: 52 U/L
ALT SERPL-CCNC: 27 U/L
ANION GAP SERPL CALC-SCNC: 12 MMOL/L
AST SERPL-CCNC: 30 U/L
BASOPHILS # BLD AUTO: 0.1 K/UL — SIGNIFICANT CHANGE UP (ref 0–0.2)
BASOPHILS NFR BLD AUTO: 1.6 % — SIGNIFICANT CHANGE UP (ref 0–2)
BILIRUB SERPL-MCNC: 0.4 MG/DL
BUN SERPL-MCNC: 8 MG/DL
CALCIUM SERPL-MCNC: 10 MG/DL
CHLORIDE SERPL-SCNC: 104 MMOL/L
CO2 SERPL-SCNC: 26 MMOL/L
CREAT SERPL-MCNC: 0.77 MG/DL
EOSINOPHIL # BLD AUTO: 0 K/UL — SIGNIFICANT CHANGE UP (ref 0–0.5)
EOSINOPHIL NFR BLD AUTO: 1.2 % — SIGNIFICANT CHANGE UP (ref 0–6)
GLUCOSE SERPL-MCNC: 100 MG/DL
HCT VFR BLD CALC: 47.1 % — HIGH (ref 34.5–45)
HGB BLD-MCNC: 15.3 G/DL — SIGNIFICANT CHANGE UP (ref 11.5–15.5)
LYMPHOCYTES # BLD AUTO: 1 K/UL — SIGNIFICANT CHANGE UP (ref 1–3.3)
LYMPHOCYTES # BLD AUTO: 26.5 % — SIGNIFICANT CHANGE UP (ref 13–44)
MCHC RBC-ENTMCNC: 32.5 G/DL — SIGNIFICANT CHANGE UP (ref 32–36)
MCHC RBC-ENTMCNC: 33.8 PG — SIGNIFICANT CHANGE UP (ref 27–34)
MCV RBC AUTO: 104.1 FL — HIGH (ref 80–100)
MONOCYTES # BLD AUTO: 0.5 K/UL — SIGNIFICANT CHANGE UP (ref 0–0.9)
MONOCYTES NFR BLD AUTO: 13.9 % — SIGNIFICANT CHANGE UP (ref 2–14)
NEUTROPHILS # BLD AUTO: 2.1 K/UL — SIGNIFICANT CHANGE UP (ref 1.8–7.4)
NEUTROPHILS NFR BLD AUTO: 56.8 % — SIGNIFICANT CHANGE UP (ref 43–77)
PLATELET # BLD AUTO: 262 K/UL — SIGNIFICANT CHANGE UP (ref 150–400)
POTASSIUM SERPL-SCNC: 4.3 MMOL/L
PROT SERPL-MCNC: 7.4 G/DL
RBC # BLD: 4.52 M/UL — SIGNIFICANT CHANGE UP (ref 3.8–5.2)
RBC # FLD: 11.6 % — SIGNIFICANT CHANGE UP (ref 10.3–14.5)
SODIUM SERPL-SCNC: 142 MMOL/L
WBC # BLD: 3.7 K/UL — LOW (ref 3.8–10.5)
WBC # FLD AUTO: 3.7 K/UL — LOW (ref 3.8–10.5)

## 2021-07-07 PROCEDURE — 99213 OFFICE O/P EST LOW 20 MIN: CPT

## 2021-07-07 PROCEDURE — 99072 ADDL SUPL MATRL&STAF TM PHE: CPT

## 2021-07-08 PROBLEM — D47.2 IGM MONOCLONAL GAMMOPATHY OF UNCERTAIN SIGNIFICANCE: Status: ACTIVE | Noted: 2018-05-23

## 2021-07-08 LAB
DEPRECATED KAPPA LC FREE/LAMBDA SER: 1.53 RATIO
IGA SER QL IEP: 270 MG/DL
IGG SER QL IEP: 934 MG/DL
IGM SER QL IEP: 283 MG/DL
KAPPA LC CSF-MCNC: 0.99 MG/DL
KAPPA LC SERPL-MCNC: 1.51 MG/DL

## 2021-07-08 NOTE — HISTORY OF PRESENT ILLNESS
[de-identified] : \par Ms. Ervin is a 51 year old female with a history of RA (treated on Xeljanz and Plaquenil), following up for MGUS. She was referred with IgM 362, [place] LC 1.49, ratio 24.83. \par There has been no evidence of progressive gammopathy.\par \par \par  [de-identified] : Today's IgM 283.

## 2021-07-08 NOTE — ASSESSMENT
[FreeTextEntry1] : 51-year-old woman with rheumatoid arthritis and a small stable IgM monoclonal gammopathy spike.\par No evidence of progression over time.\par Continue to follow-up with rheumatology.\par Available as needed.

## 2021-07-08 NOTE — PHYSICAL EXAM
Patient calling regarding Nortriptyline states she was unaware that Dr. Roger Perez prescribed this medication because she is also prescribed this same medication but 75mg twice daily by her Psychiatric NP Agnieszka Summers at [facility]. Patient is unsure if she should take this one for Dr. Roger Perez. [Normal] : affect appropriate

## 2021-12-13 NOTE — ED ADULT NURSE NOTE - PLAN OF CARE
Patient instructed to see NEUROLOGY/INTERNIST or go to ER ASAP if facial involvement develops. Side rails/Explanation of exam/test/Position of comfort

## 2022-09-02 ENCOUNTER — APPOINTMENT (OUTPATIENT)
Dept: OTOLARYNGOLOGY | Facility: CLINIC | Age: 52
End: 2022-09-02

## 2022-09-02 VITALS
DIASTOLIC BLOOD PRESSURE: 60 MMHG | OXYGEN SATURATION: 98 % | TEMPERATURE: 97.8 F | WEIGHT: 174 LBS | SYSTOLIC BLOOD PRESSURE: 102 MMHG | HEART RATE: 70 BPM | HEIGHT: 66 IN | BODY MASS INDEX: 27.97 KG/M2

## 2022-09-02 DIAGNOSIS — L90.5 SCAR CONDITIONS AND FIBROSIS OF SKIN: ICD-10-CM

## 2022-09-02 PROCEDURE — 99204 OFFICE O/P NEW MOD 45 MIN: CPT | Mod: 25

## 2022-09-02 PROCEDURE — 31231 NASAL ENDOSCOPY DX: CPT

## 2022-09-07 PROBLEM — L90.5 SCAR OF NOSE: Status: ACTIVE | Noted: 2022-09-07

## 2022-09-07 NOTE — HISTORY OF PRESENT ILLNESS
[de-identified] : 52F presents for evaluation of recurrent sinusitis\par She reports 10+ sinus infections in last 12mo requiring abx\par Her infectious episodes are characterized by congestion, pressure, PND\par Worse on L- sometimes affects ear\par This year particularly bad\par No hx of sinonasal sx but has previously undergone cauterization for epistaxis\par \par PMH: RA, thyroid issues, reflux\par \par

## 2022-09-21 ENCOUNTER — APPOINTMENT (OUTPATIENT)
Dept: CT IMAGING | Facility: CLINIC | Age: 52
End: 2022-09-21

## 2022-09-21 PROCEDURE — 70486 CT MAXILLOFACIAL W/O DYE: CPT

## 2022-10-06 ENCOUNTER — NON-APPOINTMENT (OUTPATIENT)
Age: 52
End: 2022-10-06

## 2022-10-14 ENCOUNTER — APPOINTMENT (OUTPATIENT)
Dept: OTOLARYNGOLOGY | Facility: CLINIC | Age: 52
End: 2022-10-14

## 2023-01-05 ENCOUNTER — APPOINTMENT (OUTPATIENT)
Dept: OTOLARYNGOLOGY | Facility: CLINIC | Age: 53
End: 2023-01-05
Payer: COMMERCIAL

## 2023-01-05 VITALS
BODY MASS INDEX: 28.45 KG/M2 | WEIGHT: 177 LBS | HEIGHT: 66 IN | HEART RATE: 65 BPM | DIASTOLIC BLOOD PRESSURE: 77 MMHG | SYSTOLIC BLOOD PRESSURE: 116 MMHG

## 2023-01-05 DIAGNOSIS — J32.9 CHRONIC SINUSITIS, UNSPECIFIED: ICD-10-CM

## 2023-01-05 PROCEDURE — 99213 OFFICE O/P EST LOW 20 MIN: CPT | Mod: 25

## 2023-01-05 PROCEDURE — 31231 NASAL ENDOSCOPY DX: CPT

## 2023-01-05 RX ORDER — FLUTICASONE PROPIONATE 50 UG/1
50 SPRAY, METERED NASAL
Qty: 1 | Refills: 3 | Status: ACTIVE | COMMUNITY
Start: 2022-09-02 | End: 1900-01-01

## 2023-01-06 PROBLEM — J32.9 RECURRENT SINUSITIS: Status: ACTIVE | Noted: 2022-09-02

## 2023-01-06 NOTE — HISTORY OF PRESENT ILLNESS
[de-identified] : 52 year old female hx CRS presents for follow up \par LCV 09/02/22 at which time prescribed flonase\par CT Sinus 09/21/22 Impression: Mild right ethmoid mucosal thickening. The paranasal sinuses are otherwise clear.\par Nasal septal deviation to the left with narrowing of left nasal cavity.\par Narrowing of drainage pathways which may predispose to sinus outflow obstruction.\par \par Using Flonase daily for 3+ months with improvement \par Stopped for a week in December when COVID+ at which time had increased congestion, PND and R clogged ear\par Has since restarted Flonase with improving symptoms \par No recent sinus infections\par Denies facial pain/pressure\par Sense of smell is good